# Patient Record
Sex: FEMALE | Race: WHITE | NOT HISPANIC OR LATINO | Employment: UNEMPLOYED | URBAN - METROPOLITAN AREA
[De-identification: names, ages, dates, MRNs, and addresses within clinical notes are randomized per-mention and may not be internally consistent; named-entity substitution may affect disease eponyms.]

---

## 2022-09-23 ENCOUNTER — OFFICE VISIT (OUTPATIENT)
Dept: INTERNAL MEDICINE CLINIC | Facility: CLINIC | Age: 37
End: 2022-09-23

## 2022-09-23 VITALS
HEIGHT: 63 IN | HEART RATE: 91 BPM | DIASTOLIC BLOOD PRESSURE: 75 MMHG | TEMPERATURE: 98.1 F | BODY MASS INDEX: 25.87 KG/M2 | SYSTOLIC BLOOD PRESSURE: 107 MMHG | WEIGHT: 146 LBS

## 2022-09-23 DIAGNOSIS — F32.A ANXIETY AND DEPRESSION: ICD-10-CM

## 2022-09-23 DIAGNOSIS — Z12.4 SCREENING FOR CERVICAL CANCER: ICD-10-CM

## 2022-09-23 DIAGNOSIS — R59.9 LYMPH NODE ENLARGEMENT: ICD-10-CM

## 2022-09-23 DIAGNOSIS — Z00.00 ANNUAL PHYSICAL EXAM: Primary | ICD-10-CM

## 2022-09-23 DIAGNOSIS — F41.9 ANXIETY AND DEPRESSION: ICD-10-CM

## 2022-09-23 PROCEDURE — 99385 PREV VISIT NEW AGE 18-39: CPT | Performed by: INTERNAL MEDICINE

## 2022-09-23 RX ORDER — MIRTAZAPINE 7.5 MG/1
7.5 TABLET, FILM COATED ORAL
Qty: 90 TABLET | Refills: 3 | Status: SHIPPED | OUTPATIENT
Start: 2022-09-23 | End: 2022-09-27 | Stop reason: SINTOL

## 2022-09-23 NOTE — PROGRESS NOTES
[address] [facility]    NAME: Jeffy Mercado  AGE: 44 y o  SEX: female  : 1985     DATE: 2022     Assessment and Plan:     Problem List Items Addressed This Visit    None     Visit Diagnoses     Annual physical exam    -  Primary    Relevant Orders    CBC and differential    Comprehensive metabolic panel    TSH, 3rd generation with Free T4 reflex    Lipid Panel with Direct LDL reflex    Screening for cervical cancer        Relevant Orders    Ambulatory Referral to Gynecology    Lymph node enlargement        Relevant Orders    US head neck soft tissue    Anxiety and depression        Relevant Medications    mirtazapine (REMERON) 7 5 MG tablet        1  Anxiety/Depression  - Mirtazapine prescribed to help with sleep and depression  2  R anterior cervical lymph node enlargement with FH lymphoma  -Ultrasound for abnormal features  -TSH ordered  3  Health Maintenance  Report Hep C and HIV screen in past negative, reports Tdap in last 10 years  -Referral for gyn  -labwork    Immunizations and preventive care screenings were discussed with patient today  Appropriate education was printed on patient's after visit summary  Counseling:  Alcohol/drug use: discussed moderation in alcohol intake, the recommendations for healthy alcohol use, and avoidance of illicit drug use  Dental Health: discussed importance of regular tooth brushing, flossing, and dental visits  · Exercise: the importance of regular exercise/physical activity was discussed  Recommend exercise 3-5 times per week for at least 30 minutes  BMI Counseling: Body mass index is 25 86 kg/m²  The BMI is above normal  Nutrition recommendations include decreasing fast food intake  Exercise recommendations include exercising 3-5 times per week  Rationale for BMI follow-up plan is due to patient being overweight or obese       Depression Screening and Follow-up Plan: Patient's depression screening was positive with a PHQ-2 score of 5  Their PHQ-9 score was 16  Return in about 3 months (around 2022) for Recheck, Next scheduled follow up  Chief Complaint:     Chief Complaint   Patient presents with    Annual Exam     Lump on right side of neck- HX of lymphoma in family      History of Present Illness:     Anthony Tucker is a 44 yr old F with PMH of psoriasis, anxiety/depression, breast reduction, lumbar discectomy  presents to establish care and concern for R neck lump  Pt reports sciatica and lower body muscle cramps at night  Denies neck pain, no fever/chills/weight loss  Lower back to R leg pain, muscle tension, no tingling  Unable to exercise due to lower back pain  Robaxin OTC at night  Denies chest pain, palpitations, SOB  Denies suicidal thought or ideations  Admits to one week of headache, did not try anything for it  FH- father heart attack in the 45s, mother had lymphoma and ovarian cancer  SH- denies smoking and drugs, occasional alcohol  Graduated with masters, unemployed  Traveled to [facility], [facility], [facility], from [facility])    Adult Annual Physical   Patient here for a comprehensive physical exam  The patient reports problems - R neck mass, anxiety/depression  Diet and Physical Activity  · Diet/Nutrition: well balanced diet and limited junk food  · Exercise: no formal exercise        Depression Screening  PHQ-2/9 Depression Screening    Little interest or pleasure in doing things: 2 - more than half the days  Feeling down, depressed, or hopeless: 3 - nearly every day  Trouble falling or staying asleep, or sleeping too much: 2 - more than half the days  Feeling tired or having little energy: 3 - nearly every day  Poor appetite or overeatin - several days  Feeling bad about yourself - or that you are a failure or have let yourself or your family down: 3 - nearly every day  Trouble concentrating on things, such as reading the newspaper or watching television: 2 - more than half the days  Moving or speaking so slowly that other people could have noticed  Or the opposite - being so fidgety or restless that you have been moving around a lot more than usual: 0 - not at all  Thoughts that you would be better off dead, or of hurting yourself in some way: 0 - not at all  PHQ-2 Score: 5  PHQ-2 Interpretation: POSITIVE depression screen  PHQ-9 Score: 16   PHQ-9 Interpretation: Moderately severe depression        General Health  · Sleep: poor sleep without partner home, sleeps "like a rock" when with partner  · Hearing: normal - bilateral   · Vision: no vision problems, goes for regular eye exams and wears glasses  · Dental: no dental visits for >1 year and brushes teeth twice daily  /GYN Health  · Last menstrual period: 9/23/22  · Contraceptive method: none  · History of STDs?: yes  Review of Systems:     Review of Systems   Constitutional: Positive for activity change and fatigue  Negative for chills, fever and unexpected weight change  HENT: Negative for congestion and sore throat  Respiratory: Negative for cough, chest tightness and shortness of breath  Cardiovascular: Negative for chest pain, palpitations and leg swelling  Gastrointestinal: Negative for abdominal distention, constipation, diarrhea and vomiting  Genitourinary: Negative for dysuria and hematuria  Musculoskeletal: Positive for back pain and myalgias  Negative for arthralgias, neck pain and neck stiffness  Neurological: Positive for headaches  Negative for dizziness and weakness  Psychiatric/Behavioral: Negative for confusion and suicidal ideas        Past Medical History:     Past Medical History:   Diagnosis Date    Anxiety     Cyst of left ovary     Depression     Psoriasis       Past Surgical History:     Past Surgical History:   Procedure Laterality Date    BACK SURGERY      REDUCTION MAMMAPLASTY Bilateral       Social History: Social History     Socioeconomic History    Marital status: Single     Spouse name: None    Number of children: None    Years of education: None    Highest education level: None   Occupational History    None   Tobacco Use    Smoking status: Former Smoker    Smokeless tobacco: Former User   Vaping Use    Vaping Use: Never used   Substance and Sexual Activity    Alcohol use: Yes    Drug use: Never    Sexual activity: None   Other Topics Concern    None   Social History Narrative    None     Social Determinants of Health     Financial Resource Strain: High Risk    Difficulty of Paying Living Expenses: Very hard   Food Insecurity: No Food Insecurity    Worried About Running Out of Food in the Last Year: Never true    Bela of Food in the Last Year: Never true   Transportation Needs: No Transportation Needs    Lack of Transportation (Medical): No    Lack of Transportation (Non-Medical): No   Physical Activity: Not on file   Stress: Not on file   Social Connections: Not on file   Intimate Partner Violence: Not on file   Housing Stability: Not on file      Family History:     Family History   Problem Relation Age of Onset    Ovarian cancer Mother     Lymphoma Mother     Heart disease Father     Anxiety disorder Father     Depression Father     Anxiety disorder Sister    Cami Leisure OCD Sister     Cancer Maternal Grandmother     Lymphoma Maternal Grandfather       Current Medications:     No current outpatient medications on file  No current facility-administered medications for this visit  Allergies:     No Known Allergies   Physical Exam:     /75 (BP Location: Right arm, Patient Position: Sitting, Cuff Size: Large)   Pulse 91   Temp 98 1 °F (36 7 °C) (Temporal)   Ht 5' 3" (1 6 m)   Wt 66 2 kg (146 lb)   BMI 25 86 kg/m²     Physical Exam  Constitutional:       General: She is not in acute distress  Appearance: Normal appearance  HENT:      Head: Normocephalic and atraumatic  Right Ear: Ear canal normal       Left Ear: Ear canal normal       Nose: No congestion  Mouth/Throat:      Pharynx: Oropharynx is clear  Eyes:      Extraocular Movements: Extraocular movements intact  Conjunctiva/sclera: Conjunctivae normal       Pupils: Pupils are equal, round, and reactive to light  Neck:      Thyroid: No thyromegaly or thyroid tenderness  Trachea: Trachea normal    Cardiovascular:      Rate and Rhythm: Normal rate and regular rhythm  Pulses: Normal pulses  Heart sounds: Normal heart sounds  Pulmonary:      Effort: Pulmonary effort is normal       Breath sounds: Normal breath sounds  Abdominal:      General: Bowel sounds are normal  There is no distension  Palpations: Abdomen is soft  Tenderness: There is no abdominal tenderness  Musculoskeletal:         General: No tenderness  Normal range of motion  Cervical back: Normal range of motion and neck supple  No rigidity or tenderness  No muscular tenderness  Comments: Lumbar scar present  No spinal or paraspinal tenderness to palpation   Lymphadenopathy:      Cervical: Cervical adenopathy (R anterior along SCM, small mobile) present  Skin:     General: Skin is warm  Neurological:      General: No focal deficit present  Mental Status: She is alert and oriented to person, place, and time  Cranial Nerves: Cranial nerves are intact  Sensory: Sensation is intact  Motor: Motor function is intact            Jono Oneal MD   [address]

## 2022-09-23 NOTE — PATIENT INSTRUCTIONS

## 2022-09-27 ENCOUNTER — TELEPHONE (OUTPATIENT)
Dept: INTERNAL MEDICINE CLINIC | Facility: CLINIC | Age: 37
End: 2022-09-27

## 2022-09-27 DIAGNOSIS — F32.A DEPRESSION, UNSPECIFIED DEPRESSION TYPE: Primary | ICD-10-CM

## 2022-09-27 RX ORDER — FLUOXETINE 10 MG/1
10 CAPSULE ORAL DAILY
Qty: 30 CAPSULE | Refills: 1 | Status: SHIPPED | OUTPATIENT
Start: 2022-09-27 | End: 2023-09-22 | Stop reason: SINTOL

## 2022-09-27 NOTE — TELEPHONE ENCOUNTER
Patient states she has not purchased mirtazapine (REMERON) 7 5 MG tablet as she thinks this will make her very drowsy    She states she gets sleepy very quickly    She states she needs an anti depressant that does not focus on sleeping

## 2022-09-27 NOTE — TELEPHONE ENCOUNTER
Called and spoke to patient  Patient made aware as per note  Patient stated understanding and no questions at this time

## 2022-10-11 ENCOUNTER — HOSPITAL ENCOUNTER (OUTPATIENT)
Dept: ULTRASOUND IMAGING | Facility: HOSPITAL | Age: 37
Discharge: HOME | End: 2022-10-11

## 2022-10-11 DIAGNOSIS — R59.9 LYMPH NODE ENLARGEMENT: ICD-10-CM

## 2022-10-11 PROCEDURE — 76536 US EXAM OF HEAD AND NECK: CPT

## 2022-10-12 ENCOUNTER — APPOINTMENT (OUTPATIENT)
Dept: LAB | Facility: CLINIC | Age: 37
End: 2022-10-12

## 2022-10-12 DIAGNOSIS — Z00.00 ANNUAL PHYSICAL EXAM: ICD-10-CM

## 2022-10-12 LAB
ALBUMIN SERPL BCP-MCNC: 4.1 G/DL (ref 3.5–5)
ALP SERPL-CCNC: 45 U/L (ref 46–116)
ALT SERPL W P-5'-P-CCNC: 27 U/L (ref 12–78)
ANION GAP SERPL CALCULATED.3IONS-SCNC: 4 MMOL/L (ref 4–13)
AST SERPL W P-5'-P-CCNC: 16 U/L (ref 5–45)
BASOPHILS # BLD AUTO: 0.09 THOUSANDS/ΜL (ref 0–0.1)
BASOPHILS NFR BLD AUTO: 1 % (ref 0–1)
BILIRUB SERPL-MCNC: 0.52 MG/DL (ref 0.2–1)
BUN SERPL-MCNC: 14 MG/DL (ref 5–25)
CALCIUM SERPL-MCNC: 9.2 MG/DL (ref 8.3–10.1)
CHLORIDE SERPL-SCNC: 107 MMOL/L (ref 96–108)
CHOLEST SERPL-MCNC: 207 MG/DL
CO2 SERPL-SCNC: 27 MMOL/L (ref 21–32)
CREAT SERPL-MCNC: 0.85 MG/DL (ref 0.6–1.3)
EOSINOPHIL # BLD AUTO: 0.37 THOUSAND/ΜL (ref 0–0.61)
EOSINOPHIL NFR BLD AUTO: 6 % (ref 0–6)
ERYTHROCYTE [DISTWIDTH] IN BLOOD BY AUTOMATED COUNT: 13.2 % (ref 11.6–15.1)
GFR SERPL CREATININE-BSD FRML MDRD: 87 ML/MIN/1.73SQ M
GLUCOSE P FAST SERPL-MCNC: 97 MG/DL (ref 65–99)
HCT VFR BLD AUTO: 41.8 % (ref 34.8–46.1)
HDLC SERPL-MCNC: 65 MG/DL
HGB BLD-MCNC: 13.5 G/DL (ref 11.5–15.4)
IMM GRANULOCYTES # BLD AUTO: 0.01 THOUSAND/UL (ref 0–0.2)
IMM GRANULOCYTES NFR BLD AUTO: 0 % (ref 0–2)
LDLC SERPL CALC-MCNC: 125 MG/DL (ref 0–100)
LYMPHOCYTES # BLD AUTO: 2.19 THOUSANDS/ΜL (ref 0.6–4.47)
LYMPHOCYTES NFR BLD AUTO: 33 % (ref 14–44)
MCH RBC QN AUTO: 28.8 PG (ref 26.8–34.3)
MCHC RBC AUTO-ENTMCNC: 32.3 G/DL (ref 31.4–37.4)
MCV RBC AUTO: 89 FL (ref 82–98)
MONOCYTES # BLD AUTO: 0.55 THOUSAND/ΜL (ref 0.17–1.22)
MONOCYTES NFR BLD AUTO: 8 % (ref 4–12)
NEUTROPHILS # BLD AUTO: 3.45 THOUSANDS/ΜL (ref 1.85–7.62)
NEUTS SEG NFR BLD AUTO: 52 % (ref 43–75)
NRBC BLD AUTO-RTO: 0 /100 WBCS
PLATELET # BLD AUTO: 212 THOUSANDS/UL (ref 149–390)
PMV BLD AUTO: 11.5 FL (ref 8.9–12.7)
POTASSIUM SERPL-SCNC: 4.8 MMOL/L (ref 3.5–5.3)
PROT SERPL-MCNC: 7.5 G/DL (ref 6.4–8.4)
RBC # BLD AUTO: 4.68 MILLION/UL (ref 3.81–5.12)
SODIUM SERPL-SCNC: 138 MMOL/L (ref 135–147)
TRIGL SERPL-MCNC: 87 MG/DL
TSH SERPL DL<=0.05 MIU/L-ACNC: 2.3 UIU/ML (ref 0.45–4.5)
WBC # BLD AUTO: 6.66 THOUSAND/UL (ref 4.31–10.16)

## 2022-10-12 PROCEDURE — 84443 ASSAY THYROID STIM HORMONE: CPT

## 2022-10-12 PROCEDURE — 36415 COLL VENOUS BLD VENIPUNCTURE: CPT

## 2022-10-12 PROCEDURE — 80061 LIPID PANEL: CPT

## 2022-10-12 PROCEDURE — 85025 COMPLETE CBC W/AUTO DIFF WBC: CPT

## 2022-10-12 PROCEDURE — 80053 COMPREHEN METABOLIC PANEL: CPT

## 2022-12-02 ENCOUNTER — OFFICE VISIT (OUTPATIENT)
Dept: OBGYN CLINIC | Facility: CLINIC | Age: 37
End: 2022-12-02

## 2022-12-02 VITALS
HEIGHT: 62 IN | SYSTOLIC BLOOD PRESSURE: 102 MMHG | DIASTOLIC BLOOD PRESSURE: 72 MMHG | WEIGHT: 136.69 LBS | BODY MASS INDEX: 25.15 KG/M2

## 2022-12-02 DIAGNOSIS — R10.2 PELVIC PAIN: ICD-10-CM

## 2022-12-02 DIAGNOSIS — Z80.41 FAMILY HISTORY OF OVARIAN CANCER: ICD-10-CM

## 2022-12-02 DIAGNOSIS — Z12.4 SCREENING FOR CERVICAL CANCER: ICD-10-CM

## 2022-12-02 DIAGNOSIS — Z01.419 WELL WOMAN EXAM WITH ROUTINE GYNECOLOGICAL EXAM: Primary | ICD-10-CM
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
## 2022-12-02 NOTE — PROGRESS NOTES
Patient presents for a routine annual visit  Menarche- 14 Y/O  Last Pap Smear- Not on file  LMP- 11/16/22  Birth control- none  G0  Non smoker  Social drinker  Currently sexually active  Mom has ovarian cancer  Pt having pain with intercourse and cramping

## 2022-12-05 LAB
HPV HR 12 DNA CVX QL NAA+PROBE: NEGATIVE
HPV16 DNA CVX QL NAA+PROBE: NEGATIVE
HPV18 DNA CVX QL NAA+PROBE: NEGATIVE

## 2022-12-06 LAB
C TRACH DNA SPEC QL NAA+PROBE: NEGATIVE
N GONORRHOEA DNA SPEC QL NAA+PROBE: NEGATIVE

## 2022-12-08 ENCOUNTER — TELEPHONE (OUTPATIENT)
Dept: GENETICS | Facility: CLINIC | Age: 37
End: 2022-12-08

## 2022-12-08 NOTE — TELEPHONE ENCOUNTER
I called Carlota Olivier to schedule a new patient appointment with the [facility]       Outcome:   I left a voice message encouraging the patient to call the genetics team at [phone] to schedule this appointment  Follow-up:   At this time the referral will be closed and we will wait to hear back from the patient regarding scheduling this appointment

## 2022-12-12 LAB
LAB AP GYN PRIMARY INTERPRETATION: NORMAL
Lab: NORMAL

## 2022-12-13 LAB — T VAGINALIS RRNA SPEC QL NAA+PROBE: NEGATIVE

## 2022-12-14 ENCOUNTER — HOSPITAL ENCOUNTER (OUTPATIENT)
Dept: RADIOLOGY | Age: 37
Discharge: HOME | End: 2022-12-14

## 2022-12-14 DIAGNOSIS — R10.2 PELVIC PAIN: ICD-10-CM

## 2023-09-22 ENCOUNTER — TELEPHONE (OUTPATIENT)
Dept: LAB | Facility: HOSPITAL | Age: 38
End: 2023-09-22

## 2023-09-22 ENCOUNTER — OFFICE VISIT (OUTPATIENT)
Dept: FAMILY MEDICINE CLINIC | Facility: CLINIC | Age: 38
End: 2023-09-22
Payer: COMMERCIAL

## 2023-09-22 VITALS
SYSTOLIC BLOOD PRESSURE: 120 MMHG | TEMPERATURE: 96.4 F | HEART RATE: 74 BPM | OXYGEN SATURATION: 99 % | WEIGHT: 138.2 LBS | HEIGHT: 63 IN | DIASTOLIC BLOOD PRESSURE: 70 MMHG | BODY MASS INDEX: 24.49 KG/M2

## 2023-09-22 DIAGNOSIS — Z28.21 IMMUNIZATION REFUSED: ICD-10-CM

## 2023-09-22 DIAGNOSIS — N80.9 ENDOMETRIOSIS: ICD-10-CM

## 2023-09-22 DIAGNOSIS — F43.21 SITUATIONAL DEPRESSION: ICD-10-CM

## 2023-09-22 DIAGNOSIS — Z82.49 FAMILY HISTORY OF HEART DISEASE: ICD-10-CM

## 2023-09-22 DIAGNOSIS — Z80.41 FAMILY HISTORY OF OVARIAN CANCER: ICD-10-CM

## 2023-09-22 DIAGNOSIS — E78.2 MIXED HYPERLIPIDEMIA: ICD-10-CM

## 2023-09-22 DIAGNOSIS — R11.0 NAUSEA: ICD-10-CM

## 2023-09-22 DIAGNOSIS — R53.82 CHRONIC FATIGUE: ICD-10-CM

## 2023-09-22 DIAGNOSIS — R10.821 RIGHT UPPER QUADRANT ABDOMINAL TENDERNESS WITH REBOUND TENDERNESS: ICD-10-CM

## 2023-09-22 DIAGNOSIS — R14.0 BLOATING: Primary | ICD-10-CM

## 2023-09-22 PROBLEM — G89.29 CHRONIC HEADACHE: Status: ACTIVE | Noted: 2023-09-22

## 2023-09-22 PROBLEM — F90.9 ADHD: Status: ACTIVE | Noted: 2023-09-22

## 2023-09-22 PROBLEM — R51.9 CHRONIC HEADACHE: Status: ACTIVE | Noted: 2023-09-22

## 2023-09-22 PROBLEM — R45.4 EXCESSIVE ANGER: Status: ACTIVE | Noted: 2023-09-22

## 2023-09-22 PROBLEM — L65.9 HAIR LOSS: Status: ACTIVE | Noted: 2023-09-22

## 2023-09-22 PROBLEM — G89.29 CHRONIC PAIN: Status: ACTIVE | Noted: 2023-09-22

## 2023-09-22 PROCEDURE — 99205 OFFICE O/P NEW HI 60 MIN: CPT | Performed by: FAMILY MEDICINE

## 2023-09-22 RX ORDER — CHLORAL HYDRATE 500 MG
1000 CAPSULE ORAL DAILY
COMMUNITY

## 2023-09-22 RX ORDER — BACILLUS COAGULANS/INULIN 1B-250 MG
CAPSULE ORAL
COMMUNITY

## 2023-09-22 RX ORDER — MAGNESIUM GLYCINATE 100 MG
CAPSULE ORAL
COMMUNITY

## 2023-09-22 NOTE — ASSESSMENT & PLAN NOTE
Finding on the blood work October 2022  Diagnosis is discussed with the patient she is not candidate for statin and low-fat diet reviewed with the patient and recommended Mediterranean diet

## 2023-09-22 NOTE — ASSESSMENT & PLAN NOTE
symptomatic has been going on for patient has a history of travel left epididymis area where common to have H. pylori infection discussed with the patient recommend to do H. pylori antigen test in the stool we will follow-up accordingly

## 2023-09-22 NOTE — ASSESSMENT & PLAN NOTE
Symptomatic  plan to do blood work to rule out thyroid problem rule out anemia rule out vitamin D deficiency and and hematology disease chronic discussed sleeping hygiene and encouraged to continue well hydration we will follow-up accordingly discussed with patient also chronic fatigue and secondary to her depression she is aware

## 2023-09-22 NOTE — PROGRESS NOTES
Name: Kirsty Allen      : 1985      MRN: [id number]  Encounter Provider: Marni Miranda MD  Encounter Date: 2023   Encounter department: 1 [facility] 270     1. Bloating  Assessment & Plan:  symptomatic has been going on for patient has a history of travel left epididymis area where common to have H. pylori infection discussed with the patient recommend to do H. pylori antigen test in the stool we will follow-up accordingly    Orders:  -     CBC and differential; Future  -     Comprehensive metabolic panel; Future  -     Lipid panel; Future  -     TSH, 3rd generation with Free T4 reflex; Future  -     Vitamin B12; Future  -     ELIZABETH Screen w/ Reflex to Titer/Pattern; Future  -     Lyme Disease Serology w/Reflex; Future  -     RF Screen w/ Reflex to Titer; Future  -     Vitamin D 25 hydroxy; Future  -     Helicobacter pylori, IgA; Future    2. Chronic fatigue  Assessment & Plan:  Symptomatic  plan to do blood work to rule out thyroid problem rule out anemia rule out vitamin D deficiency and and hematology disease chronic discussed sleeping hygiene and encouraged to continue well hydration we will follow-up accordingly discussed with patient also chronic fatigue and secondary to her depression she is aware    Orders:  -     CBC and differential; Future  -     Comprehensive metabolic panel; Future  -     Lipid panel; Future  -     TSH, 3rd generation with Free T4 reflex; Future  -     Vitamin B12; Future  -     ELIZABETH Screen w/ Reflex to Titer/Pattern; Future  -     Lyme Disease Serology w/Reflex; Future  -     RF Screen w/ Reflex to Titer; Future  -     Vitamin D 25 hydroxy; Future    3. Situational depression  Assessment & Plan:  New diagnosis patient tested positive PHQ-9 reported 16 patient follow-up with a therapist she has intolerance to medication      4.  Endometriosis  Assessment & Plan:  Refer patient to see GYN    Orders:  -     Ambulatory Referral to Obstetrics / Gynecology; Future    5. Right upper quadrant abdominal tenderness with rebound tenderness  Assessment & Plan:  Finding on physical exam positive tenderness in the right upper quadrant in Patient with history of bloating and nausea recommend to do ultrasound to rule out gallbladder disease    Orders:  -     US right upper quadrant; Future; Expected date: 09/22/2023    6. Mixed hyperlipidemia  Assessment & Plan:  Finding on the blood work October 2022  Diagnosis is discussed with the patient she is not candidate for statin and low-fat diet reviewed with the patient and recommended Mediterranean diet    Orders:  -     CBC and differential; Future  -     Comprehensive metabolic panel; Future  -     Lipid panel; Future  -     TSH, 3rd generation with Free T4 reflex; Future  -     Vitamin B12; Future  -     ELIZABETH Screen w/ Reflex to Titer/Pattern; Future  -     Lyme Disease Serology w/Reflex; Future  -     RF Screen w/ Reflex to Titer; Future  -     Vitamin D 25 hydroxy; Future    7. Nausea  -     CBC and differential; Future  -     Comprehensive metabolic panel; Future  -     Lipid panel; Future  -     TSH, 3rd generation with Free T4 reflex; Future  -     Vitamin B12; Future  -     ELIZABETH Screen w/ Reflex to Titer/Pattern; Future  -     Lyme Disease Serology w/Reflex; Future  -     RF Screen w/ Reflex to Titer; Future  -     Vitamin D 25 hydroxy; Future  -     Helicobacter pylori, IgA; Future    8. Immunization refused  Assessment & Plan:  Patient declined flu shot for today      9. Family history of heart disease  Assessment & Plan:  Patient father has coronary artery disease patient has history of hyperlipidemia recommend to recheck lipid panel      10.  Family history of ovarian cancer           Subjective      New in my office he did start his care and the patient had multiple concern  1-she is concerned about bloating nausea and has been going on for many months she feels does not matter what she eats she gets bloated and feels shortness well up no vomiting no constipation no diarrhea no blood in the stool no change in her diet patient uses to travel a lot and lives in [place] area no fever  2-also patient concerned about extreme fatigue she feels washed out and this is also has been going on for a while  patient admits she drinking enough water she is sleeping well no rash no muscle pain or atrophy  no blurred vision she had a chronic headache patient has history of chronic pain and she had history of chronic low back pain for which she had back surgery  3-patient has a history of endometriosis she did not follow-up with a specialist in [facility] and she still symptomatic  4-depression screen tested +16 PHQ-9 patient and feel it is situational since that she moved to live with  States secondary to her  he is not from here and she is feeling depressed   Past medical surgical family and social history reviewed with the patient also blood work from October 2022 reviewed with the patient she has hyperlipidemia    Review of Systems   Constitutional: Positive for fatigue. Negative for chills and fever. HENT: Negative for congestion, ear pain and sore throat. Eyes: Negative for pain and visual disturbance. Respiratory: Negative for cough and shortness of breath. Cardiovascular: Negative for chest pain and palpitations. Gastrointestinal: Positive for nausea. Negative for abdominal pain, constipation, diarrhea and vomiting. Bloating   Genitourinary: Negative for dysuria, hematuria and vaginal discharge. Skin: Negative for color change and rash. Neurological: Negative for seizures and syncope. All other systems reviewed and are negative.       Current Outpatient Medications on File Prior to Visit   Medication Sig   • Bacillus Coagulans-Inulin (Probiotic) 1-250 BILLION-MG CAPS Take by mouth   • Cholecalciferol (Vitamin D-3) 125 MCG (5000 UT) TABS Take by mouth   • Magnesium Glycinate 100 MG CAPS Take by mouth   • Omega-3 Fatty Acids (fish oil) 1,000 mg Take 1,000 mg by mouth daily   • [DISCONTINUED] FLUoxetine (PROzac) 10 mg capsule Take 1 capsule (10 mg total) by mouth daily       Objective     /70 (BP Location: Left arm, Patient Position: Sitting, Cuff Size: Standard)   Pulse 74   Temp (!) 96.4 °F (35.8 °C) (Tympanic)   Ht 5' 2.6" (1.59 m)   Wt 62.7 kg (138 lb 3.2 oz)   SpO2 99%   BMI 24.80 kg/m²     Physical Exam  Constitutional:       General: She is not in acute distress. Appearance: She is well-developed. She is not diaphoretic. HENT:      Head: Normocephalic. Right Ear: External ear normal.      Left Ear: External ear normal.      Nose: No congestion or rhinorrhea. Mouth/Throat:      Pharynx: No oropharyngeal exudate or posterior oropharyngeal erythema. Eyes:      General:         Right eye: No discharge. Left eye: No discharge. Conjunctiva/sclera: Conjunctivae normal.   Neck:      Vascular: No JVD. Cardiovascular:      Rate and Rhythm: Normal rate and regular rhythm. Heart sounds: Normal heart sounds. No murmur heard. No gallop. Pulmonary:      Effort: Pulmonary effort is normal. No respiratory distress. Breath sounds: Normal breath sounds. No stridor. No wheezing or rales. Chest:      Chest wall: No tenderness. Abdominal:      General: There is no distension. Palpations: Abdomen is soft. There is no mass. Tenderness: There is abdominal tenderness in the right upper quadrant. There is no guarding or rebound. Musculoskeletal:         General: No tenderness. Cervical back: Normal range of motion and neck supple. Lymphadenopathy:      Cervical: No cervical adenopathy. Skin:     General: Skin is warm. Findings: No erythema or rash. Neurological:      Mental Status: She is alert and oriented to person, place, and time.        Mal Ballard MD

## 2023-09-22 NOTE — ASSESSMENT & PLAN NOTE
Patient father has coronary artery disease patient has history of hyperlipidemia recommend to recheck lipid panel

## 2023-09-22 NOTE — TELEPHONE ENCOUNTER
Pt wanted to make an appt for the lab at [facility] - does not need mobile lab services

## 2023-09-22 NOTE — ASSESSMENT & PLAN NOTE
Finding on physical exam positive tenderness in the right upper quadrant in Patient with history of bloating and nausea recommend to do ultrasound to rule out gallbladder disease

## 2023-09-22 NOTE — ASSESSMENT & PLAN NOTE
New diagnosis patient tested positive PHQ-9 reported 16 patient follow-up with a therapist she has intolerance to medication

## 2023-09-23 ENCOUNTER — HOSPITAL ENCOUNTER (OUTPATIENT)
Dept: RADIOLOGY | Facility: HOSPITAL | Age: 38
Discharge: HOME | End: 2023-09-23
Payer: COMMERCIAL

## 2023-09-23 ENCOUNTER — LAB (OUTPATIENT)
Dept: LAB | Facility: CLINIC | Age: 38
End: 2023-09-23
Payer: COMMERCIAL

## 2023-09-23 DIAGNOSIS — E78.2 MIXED HYPERLIPIDEMIA: ICD-10-CM

## 2023-09-23 DIAGNOSIS — R11.0 NAUSEOUS: ICD-10-CM

## 2023-09-23 DIAGNOSIS — R14.0 BLOATING: ICD-10-CM

## 2023-09-23 DIAGNOSIS — R53.82 CHRONIC FATIGUE: ICD-10-CM

## 2023-09-23 DIAGNOSIS — R11.0 NAUSEA: ICD-10-CM

## 2023-09-23 DIAGNOSIS — R10.821 RIGHT UPPER QUADRANT ABDOMINAL TENDERNESS WITH REBOUND TENDERNESS: ICD-10-CM

## 2023-09-23 LAB
25(OH)D3 SERPL-MCNC: 36.7 NG/ML (ref 30–100)
ALBUMIN SERPL BCP-MCNC: 4.6 G/DL (ref 3.5–5)
ALP SERPL-CCNC: 41 U/L (ref 34–104)
ALT SERPL W P-5'-P-CCNC: 22 U/L (ref 7–52)
ANA SER QL IA: NEGATIVE
ANION GAP SERPL CALCULATED.3IONS-SCNC: 5 MMOL/L
AST SERPL W P-5'-P-CCNC: 18 U/L (ref 13–39)
BASOPHILS # BLD AUTO: 0.09 THOUSANDS/ÂΜL (ref 0–0.1)
BASOPHILS NFR BLD AUTO: 2 % (ref 0–1)
BILIRUB SERPL-MCNC: 0.45 MG/DL (ref 0.2–1)
BUN SERPL-MCNC: 11 MG/DL (ref 5–25)
CALCIUM SERPL-MCNC: 9.6 MG/DL (ref 8.4–10.2)
CHLORIDE SERPL-SCNC: 107 MMOL/L (ref 96–108)
CHOLEST SERPL-MCNC: 212 MG/DL
CO2 SERPL-SCNC: 28 MMOL/L (ref 21–32)
CREAT SERPL-MCNC: 0.76 MG/DL (ref 0.6–1.3)
EOSINOPHIL # BLD AUTO: 0.3 THOUSAND/ÂΜL (ref 0–0.61)
EOSINOPHIL NFR BLD AUTO: 5 % (ref 0–6)
ERYTHROCYTE [DISTWIDTH] IN BLOOD BY AUTOMATED COUNT: 12.8 % (ref 11.6–15.1)
GFR SERPL CREATININE-BSD FRML MDRD: 99 ML/MIN/1.73SQ M
GLUCOSE P FAST SERPL-MCNC: 91 MG/DL (ref 65–99)
HCT VFR BLD AUTO: 41.8 % (ref 34.8–46.1)
HDLC SERPL-MCNC: 65 MG/DL
HGB BLD-MCNC: 13.6 G/DL (ref 11.5–15.4)
IMM GRANULOCYTES # BLD AUTO: 0.01 THOUSAND/UL (ref 0–0.2)
IMM GRANULOCYTES NFR BLD AUTO: 0 % (ref 0–2)
LDLC SERPL CALC-MCNC: 137 MG/DL (ref 0–100)
LYMPHOCYTES # BLD AUTO: 2.13 THOUSANDS/ÂΜL (ref 0.6–4.47)
LYMPHOCYTES NFR BLD AUTO: 38 % (ref 14–44)
MCH RBC QN AUTO: 29.6 PG (ref 26.8–34.3)
MCHC RBC AUTO-ENTMCNC: 32.5 G/DL (ref 31.4–37.4)
MCV RBC AUTO: 91 FL (ref 82–98)
MONOCYTES # BLD AUTO: 0.35 THOUSAND/ÂΜL (ref 0.17–1.22)
MONOCYTES NFR BLD AUTO: 6 % (ref 4–12)
NEUTROPHILS # BLD AUTO: 2.74 THOUSANDS/ÂΜL (ref 1.85–7.62)
NEUTS SEG NFR BLD AUTO: 49 % (ref 43–75)
NONHDLC SERPL-MCNC: 147 MG/DL
NRBC BLD AUTO-RTO: 0 /100 WBCS
PLATELET # BLD AUTO: 195 THOUSANDS/UL (ref 149–390)
PMV BLD AUTO: 11.8 FL (ref 8.9–12.7)
POTASSIUM SERPL-SCNC: 4.4 MMOL/L (ref 3.5–5.3)
PROT SERPL-MCNC: 7.2 G/DL (ref 6.4–8.4)
RBC # BLD AUTO: 4.59 MILLION/UL (ref 3.81–5.12)
SODIUM SERPL-SCNC: 140 MMOL/L (ref 135–147)
TRIGL SERPL-MCNC: 48 MG/DL
TSH SERPL DL<=0.05 MIU/L-ACNC: 2.73 UIU/ML (ref 0.45–4.5)
VIT B12 SERPL-MCNC: 1436 PG/ML (ref 180–914)
WBC # BLD AUTO: 5.62 THOUSAND/UL (ref 4.31–10.16)

## 2023-09-23 PROCEDURE — 84443 ASSAY THYROID STIM HORMONE: CPT

## 2023-09-23 PROCEDURE — 80053 COMPREHEN METABOLIC PANEL: CPT

## 2023-09-23 PROCEDURE — 85025 COMPLETE CBC W/AUTO DIFF WBC: CPT

## 2023-09-23 PROCEDURE — 76705 ECHO EXAM OF ABDOMEN: CPT

## 2023-09-23 PROCEDURE — 82306 VITAMIN D 25 HYDROXY: CPT

## 2023-09-23 PROCEDURE — 36415 COLL VENOUS BLD VENIPUNCTURE: CPT

## 2023-09-23 PROCEDURE — 82607 VITAMIN B-12: CPT

## 2023-09-23 PROCEDURE — 80061 LIPID PANEL: CPT

## 2023-09-23 PROCEDURE — 86430 RHEUMATOID FACTOR TEST QUAL: CPT

## 2023-09-23 PROCEDURE — 86618 LYME DISEASE ANTIBODY: CPT

## 2023-09-23 PROCEDURE — 86038 ANTINUCLEAR ANTIBODIES: CPT

## 2023-09-23 PROCEDURE — 86677 HELICOBACTER PYLORI ANTIBODY: CPT

## 2023-09-24 LAB
B BURGDOR IGG+IGM SER QL IA: NEGATIVE
RHEUMATOID FACT SER QL LA: NEGATIVE

## 2023-09-25 ENCOUNTER — APPOINTMENT (OUTPATIENT)
Dept: LAB | Facility: HOSPITAL | Age: 38
End: 2023-09-25
Payer: COMMERCIAL

## 2023-09-25 ENCOUNTER — TELEPHONE (OUTPATIENT)
Dept: FAMILY MEDICINE CLINIC | Facility: CLINIC | Age: 38
End: 2023-09-25

## 2023-09-25 DIAGNOSIS — R14.0 BLOATING: Primary | ICD-10-CM

## 2023-09-25 DIAGNOSIS — R14.0 BLOATING: ICD-10-CM

## 2023-09-25 DIAGNOSIS — R53.82 CHRONIC FATIGUE: Primary | ICD-10-CM

## 2023-09-25 PROCEDURE — 87338 HPYLORI STOOL AG IA: CPT

## 2023-09-25 NOTE — TELEPHONE ENCOUNTER
Patient saw her B12 results but wanted you to know that she was taking beef liver supplements that is high in B12 which she did stop taking

## 2023-09-25 NOTE — TELEPHONE ENCOUNTER
----- Message from Nori White MD sent at 9/25/2023  3:23 PM EDT -----  Recommend to hold on any B12 supplement repeat level in 4 w

## 2023-09-26 LAB — H PYLORI AG STL QL IA: NEGATIVE

## 2023-09-29 ENCOUNTER — TELEPHONE (OUTPATIENT)
Age: 38
End: 2023-09-29

## 2023-09-29 ENCOUNTER — TELEPHONE (OUTPATIENT)
Dept: FAMILY MEDICINE CLINIC | Facility: CLINIC | Age: 38
End: 2023-09-29

## 2023-09-29 DIAGNOSIS — R93.89 ABNORMAL FINDINGS ON DIAGNOSTIC IMAGING OF BODY STRUCTURES: Primary | ICD-10-CM

## 2023-09-29 NOTE — TELEPHONE ENCOUNTER
Patient notified of results referral placed for urology contact information provided to patient to schedule

## 2023-09-29 NOTE — TELEPHONE ENCOUNTER
Called Margi and schedule her with Dr Randa Barnhart at the [facility] office I October 26, 2023@ 11:00

## 2023-09-29 NOTE — TELEPHONE ENCOUNTER
New Patient    What is the reason for the patient’s appointment?:Patient called stating she was referred to see Urology . She had u/s done and it showed     Incidental 6 mm right renal echogenic nodule most likely angiomyolipoma. Renal ultrasound follow-up in 6 months is advised.     Remainder of the examination is normal.     This study demonstrates a finding requiring imaging follow-up and was documented as such in Epic.     Please review and see how soon patient is to be seen    Patient can be reached at [phone]       What office location does the patient prefer?:[place] but will go to sooner appt at [facility] or [facility]    Does patient have Imaging/Lab Results:    Have patient records been requested?:  If No, are the records showing in Epic:       INSURANCE:   Do we accept the patient's insurance or is the patient Self-Pay?:    [address] [place] [address] Type/Number:   Member ID#:       HISTORY:   Has the patient had any previous Urologist(s)?:no     Was the patient seen in the ED?:    Has the patient had any outside testing done?:    Does the patient have a personal history of cancer?:no .

## 2023-09-29 NOTE — TELEPHONE ENCOUNTER
----- Message from Cookie Burroughs MD sent at 9/29/2023 10:43 AM EDT -----  Incidental findings on the ultrasound renal nodule recommend to follow-up by urology

## 2023-10-03 ENCOUNTER — TELEPHONE (OUTPATIENT)
Dept: FAMILY MEDICINE CLINIC | Facility: CLINIC | Age: 38
End: 2023-10-03

## 2023-10-03 DIAGNOSIS — L29.0 RECTAL ITCHING: ICD-10-CM

## 2023-10-03 DIAGNOSIS — R10.84 GENERALIZED ABDOMINAL PAIN: ICD-10-CM

## 2023-10-03 DIAGNOSIS — R93.89 ABNORMAL FINDINGS ON DIAGNOSTIC IMAGING OF BODY STRUCTURES: Primary | ICD-10-CM

## 2023-10-03 LAB — MISCELLANEOUS LAB TEST RESULT: NORMAL

## 2023-10-03 NOTE — TELEPHONE ENCOUNTER
Patient called into the office to see if Dr. Rangel Ortega would also send over test for parasites along with h pylori. Patient is concerned for parasite due to symptoms and [place]. Please advise.

## 2023-10-03 NOTE — TELEPHONE ENCOUNTER
----- Message from Constantin Hayes MD sent at 10/3/2023  1:16 PM EDT -----  Recommend H pylori Antigen in stool

## 2023-10-04 NOTE — TELEPHONE ENCOUNTER
Called and spoke with patient to see if patient is having any symptoms for parasite test to be placed. Patient states that she has constant abdomen pain. Patient also states that she has been having rectal itching for the last year and a had or two years. patient states that the itching starts and lasts for a few weeks and goes away than proceeds to come back.

## 2023-10-09 ENCOUNTER — APPOINTMENT (OUTPATIENT)
Dept: LAB | Facility: CLINIC | Age: 38
End: 2023-10-09
Payer: COMMERCIAL

## 2023-10-09 ENCOUNTER — APPOINTMENT (OUTPATIENT)
Dept: LAB | Facility: CLINIC | Age: 38
End: 2023-10-09

## 2023-10-09 DIAGNOSIS — R53.82 CHRONIC FATIGUE: ICD-10-CM

## 2023-10-11 ENCOUNTER — TELEPHONE (OUTPATIENT)
Dept: FAMILY MEDICINE CLINIC | Facility: CLINIC | Age: 38
End: 2023-10-11

## 2023-10-11 DIAGNOSIS — K92.1 BLOOD IN STOOL: Primary | ICD-10-CM

## 2023-10-11 NOTE — TELEPHONE ENCOUNTER
----- Message from Presley Fishman MD sent at 10/11/2023  1:14 PM EDT -----  Positive blood in stool to see GI

## 2023-10-11 NOTE — TELEPHONE ENCOUNTER
Anesthesia Evaluation     Patient summary reviewed and Nursing notes reviewed   no history of anesthetic complications:  NPO Solid Status: > 6 hours  NPO Liquid Status: > 6 hours           Airway   Mallampati: II  TM distance: >3 FB  Neck ROM: full  no difficulty expected and No difficulty expected  Dental - normal exam   (+) edentulous    Pulmonary - normal exam    breath sounds clear to auscultation  (+) sleep apnea,   (-) rhonchi, decreased breath sounds, wheezes, rales, stridor  Cardiovascular - normal exam    NYHA Classification: I  Rhythm: regular  Rate: normal    (+) hypertension, PVD, hyperlipidemia,   (-) murmur, weak pulses, friction rub, systolic click, carotid bruits, JVD, peripheral edema      Neuro/Psych  (+) seizures, headaches, syncope, numbness,      ROS Comment: Ataxia    GI/Hepatic/Renal/Endo    (+) obesity,   diabetes mellitus type 1 using insulin,     Musculoskeletal     (+) back pain, neck pain,   Abdominal  - normal exam    Abdomen: soft.   Substance History - negative use     OB/GYN negative ob/gyn ROS         Other   arthritis,    history of cancer remission                    Anesthesia Plan    ASA 3     MAC     intravenous induction     Anesthetic plan, risks, benefits, and alternatives have been provided, discussed and informed consent has been obtained with: patient.        CODE STATUS:    Medical Intervention Limits: NO intubation (DNI)  Level Of Support Discussed With: Patient  Code Status (Patient has no pulse and is not breathing): No CPR (Do Not Attempt to Resuscitate)  Medical Interventions (Patient has pulse or is breathing): Limited Support  Release to patient: Routine Release       Lm for patient regarding blood in stool and also left a my chart message.  Referral placed for GI

## 2023-10-12 ENCOUNTER — TELEPHONE (OUTPATIENT)
Dept: FAMILY MEDICINE CLINIC | Facility: CLINIC | Age: 38
End: 2023-10-12

## 2023-10-12 NOTE — TELEPHONE ENCOUNTER
Patient left message that she was positive for hplori- was sent to gi but can't get in until November. Wants to know what to do until then.  Please advise

## 2023-10-23 ENCOUNTER — OFFICE VISIT (OUTPATIENT)
Dept: OBGYN CLINIC | Facility: CLINIC | Age: 38
End: 2023-10-23
Payer: COMMERCIAL

## 2023-10-23 VITALS — WEIGHT: 141 LBS | SYSTOLIC BLOOD PRESSURE: 100 MMHG | BODY MASS INDEX: 25.3 KG/M2 | DIASTOLIC BLOOD PRESSURE: 64 MMHG

## 2023-10-23 DIAGNOSIS — N80.9 ENDOMETRIOSIS: ICD-10-CM

## 2023-10-23 DIAGNOSIS — R10.2 PELVIC PAIN: Primary | ICD-10-CM

## 2023-10-23 PROCEDURE — 99214 OFFICE O/P EST MOD 30 MIN: CPT | Performed by: STUDENT IN AN ORGANIZED HEALTH CARE EDUCATION/TRAINING PROGRAM

## 2023-10-23 NOTE — PROGRESS NOTES
Assessment/Plan:     Problem List Items Addressed This Visit          Other    Endometriosis    Relevant Orders    US pelvis complete w transvaginal     Other Visit Diagnoses       Pelvic pain    -  Primary    Relevant Orders    US pelvis complete w transvaginal          - discussed clinical diagnosis of endometriosis. No longer required to have diagnostic laparoscopy to diagnose or treat. With cramping prior to menses, chronic pelvic pain, dyschesia, dyspareunia, likely infertility, she certainly meets criteria. HOWEVER, prior STI, potential GI confounders could also cause many of her symptoms. Reviewed the erosion of endometriosis through the bowel mucosa, for resultant hematochezia, is rare. - reviewed images of prior ultrasound in detail with patient. Discussed possible endometrioma on ovary vs. Hemorrhagic/collapsing cyst. Given her pain is mostly on this side, the two could most certainly be related. - will RTO after discussion with GI, for review of ultrasound, and for discussion regarding surgical intervention. Will likely recommend TLH, BS with resection of endometriosis for most definitive management, given lack of interest in childbearing. Will discuss BSO at that time given family history. RTO 3-4 weeks    Subjective:      Patient ID: Eitan Aranda is a 43 y.o. female. HPI  She presents today for consultation regarding endometriosis. She has never had a "formal" diagnosis, had thought/been told that she needed a diagnostic laparoscopy for same. She notes that she had been on OCPs for many years, stopped secondary to mood symptoms 10 years ago. Since then, she has struggled with dysmenorrhea, both before and during cycles. Pain has been worsening over time. In the last few years, she has noted worsening RLQ pain. In the last year, she notes constant low midline pelvic pain. In the last few months, she notes non-positional dyspareunia and postcoital bleeding.      Used condoms x10 years since d/c OCPs, without any pregnancy scares. Has been using coitus interruptus x2 years with now . She is not interested in childbearing, hopes for adoption. The following portions of the patient's history were reviewed and updated as appropriate: allergies, current medications, past family history, past medical history, past social history, past surgical history, and problem list.    Review of Systems  as above    Objective:  /64 (BP Location: Left arm, Patient Position: Sitting, Cuff Size: Standard)   Wt 64 kg (141 lb)   LMP 10/21/2023 (Exact Date)   BMI 25.30 kg/m²      Physical Exam  Vitals reviewed. Constitutional:       Appearance: She is well-developed. HENT:      Head: Normocephalic. Cardiovascular:      Rate and Rhythm: Normal rate. Pulmonary:      Effort: Pulmonary effort is normal.   Abdominal:      General: There is no distension. Palpations: Abdomen is soft. Tenderness: There is no abdominal tenderness. There is no guarding or rebound. Genitourinary:     General: Normal vulva. Exam position: Lithotomy position. Vagina: No bleeding. Cervix: No cervical motion tenderness. Comments: Vulva normal, with retraction of perineal body. Vagina normal, without visible endometrial implants  On bimanual exam, uterus does not feel mobile, retroverted and retroflexed. Unable to palpate uterine contour. Generalized pain throughout, non-focal. No masses palpated. Bladder notably tender to palpation. Musculoskeletal:         General: Normal range of motion. Cervical back: Normal range of motion. Skin:     General: Skin is warm and dry. Neurological:      Mental Status: She is alert and oriented to person, place, and time.    Psychiatric:         Behavior: Behavior normal.         Prior TVUS 12/22: uterus heterogeneous c/w probably adenomyosis and right ovary with 1cm heterogeneous cyst c/w possible endometrioma    Overall MDM: moderate   Diagnosis moderate, Data moderate, Risk low

## 2023-10-26 ENCOUNTER — OFFICE VISIT (OUTPATIENT)
Dept: UROLOGY | Facility: CLINIC | Age: 38
End: 2023-10-26
Payer: COMMERCIAL

## 2023-10-26 VITALS
WEIGHT: 138.8 LBS | HEIGHT: 63 IN | DIASTOLIC BLOOD PRESSURE: 72 MMHG | OXYGEN SATURATION: 98 % | HEART RATE: 79 BPM | SYSTOLIC BLOOD PRESSURE: 112 MMHG | BODY MASS INDEX: 24.59 KG/M2

## 2023-10-26 DIAGNOSIS — R93.89 ABNORMAL FINDINGS ON DIAGNOSTIC IMAGING OF BODY STRUCTURES: Primary | ICD-10-CM

## 2023-10-26 PROCEDURE — 99204 OFFICE O/P NEW MOD 45 MIN: CPT | Performed by: UROLOGY
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
## 2023-11-03 ENCOUNTER — HOSPITAL ENCOUNTER (OUTPATIENT)
Dept: RADIOLOGY | Facility: HOSPITAL | Age: 38
Discharge: HOME | End: 2023-11-03
Attending: STUDENT IN AN ORGANIZED HEALTH CARE EDUCATION/TRAINING PROGRAM
Payer: COMMERCIAL

## 2023-11-03 DIAGNOSIS — N80.9 ENDOMETRIOSIS: ICD-10-CM

## 2023-11-03 DIAGNOSIS — R10.2 PELVIC PAIN: ICD-10-CM

## 2023-11-03 PROCEDURE — 76830 TRANSVAGINAL US NON-OB: CPT

## 2023-11-03 PROCEDURE — 76856 US EXAM PELVIC COMPLETE: CPT

## 2023-11-08 ENCOUNTER — TELEPHONE (OUTPATIENT)
Dept: GASTROENTEROLOGY | Facility: MEDICAL CENTER | Age: 38
End: 2023-11-08

## 2023-11-08 ENCOUNTER — CONSULT (OUTPATIENT)
Dept: GASTROENTEROLOGY | Facility: MEDICAL CENTER | Age: 38
End: 2023-11-08
Payer: COMMERCIAL

## 2023-11-08 VITALS
BODY MASS INDEX: 24.83 KG/M2 | WEIGHT: 138.4 LBS | TEMPERATURE: 96.3 F | HEART RATE: 68 BPM | DIASTOLIC BLOOD PRESSURE: 64 MMHG | SYSTOLIC BLOOD PRESSURE: 98 MMHG

## 2023-11-08 DIAGNOSIS — Z80.0 FAMILY HISTORY OF GI TRACT CANCER: ICD-10-CM

## 2023-11-08 DIAGNOSIS — R10.9 RIGHT SIDED ABDOMINAL PAIN: ICD-10-CM

## 2023-11-08 DIAGNOSIS — R14.0 BLOATING: Primary | ICD-10-CM

## 2023-11-08 DIAGNOSIS — K92.1 BLOOD IN STOOL: ICD-10-CM

## 2023-11-08 PROCEDURE — 99204 OFFICE O/P NEW MOD 45 MIN: CPT | Performed by: INTERNAL MEDICINE

## 2023-11-08 RX ORDER — BISACODYL 5 MG/1
10 TABLET, DELAYED RELEASE ORAL ONCE
Qty: 2 TABLET | Refills: 0 | Status: SHIPPED | OUTPATIENT
Start: 2023-11-08 | End: 2023-11-08
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
## 2023-11-15 ENCOUNTER — OFFICE VISIT (OUTPATIENT)
Dept: OBGYN CLINIC | Facility: CLINIC | Age: 38
End: 2023-11-15
Payer: COMMERCIAL

## 2023-11-15 VITALS — BODY MASS INDEX: 25.26 KG/M2 | SYSTOLIC BLOOD PRESSURE: 96 MMHG | DIASTOLIC BLOOD PRESSURE: 56 MMHG | WEIGHT: 140.8 LBS

## 2023-11-15 DIAGNOSIS — N80.9 ENDOMETRIOSIS: Primary | ICD-10-CM

## 2023-11-15 PROCEDURE — 99213 OFFICE O/P EST LOW 20 MIN: CPT | Performed by: STUDENT IN AN ORGANIZED HEALTH CARE EDUCATION/TRAINING PROGRAM

## 2023-11-15 NOTE — PROGRESS NOTES
Assessment/Plan:       Problem List Items Addressed This Visit          Other    Endometriosis - Primary     - ultrasound reviewed  - reviewed potential interventions, if she would like to proceed with surgery. Reviewed potential for TLH, debulking if necessary. She would be willing to remove one ovary, but would not be interested in BSO as she does not desire surgical menopause, even with hormonal replacement    RTO s/p GI eval and tx    Subjective:      Patient ID: Chela Mueller is a 43 y.o. female. HPI  She presents today for follow up after ultrasound. She also saw GI and is scheduled for a colonoscopy in December. The following portions of the patient's history were reviewed and updated as appropriate: allergies, current medications, past family history, past medical history, past social history, past surgical history, and problem list.    Review of Systems  see prior visit    Objective:  BP 96/56 (BP Location: Left arm, Patient Position: Sitting, Cuff Size: Standard)   Wt 63.9 kg (140 lb 12.8 oz)   LMP 10/21/2023 (Exact Date)   BMI 25.26 kg/m²      Physical Exam  Vitals reviewed. Constitutional:       Appearance: She is well-developed. HENT:      Head: Normocephalic. Pulmonary:      Effort: Pulmonary effort is normal.   Musculoskeletal:         General: Normal range of motion. Cervical back: Normal range of motion. Neurological:      Mental Status: She is alert and oriented to person, place, and time.    Psychiatric:         Behavior: Behavior normal.

## 2023-12-13 ENCOUNTER — ANESTHESIA EVENT (OUTPATIENT)
Dept: ANESTHESIOLOGY | Facility: HOSPITAL | Age: 38
End: 2023-12-13

## 2023-12-13 ENCOUNTER — ANESTHESIA (OUTPATIENT)
Dept: ANESTHESIOLOGY | Facility: HOSPITAL | Age: 38
End: 2023-12-13

## 2023-12-18 RX ORDER — SODIUM CHLORIDE 9 MG/ML
125 INJECTION, SOLUTION INTRAVENOUS CONTINUOUS
Status: CANCELLED | OUTPATIENT
Start: 2023-12-18

## 2023-12-20 ENCOUNTER — ANESTHESIA EVENT (OUTPATIENT)
Dept: GASTROENTEROLOGY | Facility: MEDICAL CENTER | Age: 38
End: 2023-12-20

## 2023-12-20 ENCOUNTER — ANESTHESIA (OUTPATIENT)
Dept: GASTROENTEROLOGY | Facility: MEDICAL CENTER | Age: 38
End: 2023-12-20

## 2023-12-20 ENCOUNTER — HOSPITAL ENCOUNTER (OUTPATIENT)
Dept: GASTROENTEROLOGY | Facility: MEDICAL CENTER | Age: 38
Setting detail: OUTPATIENT SURGERY
Discharge: HOME | End: 2023-12-20
Attending: INTERNAL MEDICINE
Payer: COMMERCIAL

## 2023-12-20 VITALS
TEMPERATURE: 97.2 F | RESPIRATION RATE: 1 BRPM | DIASTOLIC BLOOD PRESSURE: 55 MMHG | HEART RATE: 89 BPM | OXYGEN SATURATION: 99 % | HEIGHT: 62 IN | SYSTOLIC BLOOD PRESSURE: 106 MMHG | BODY MASS INDEX: 25.75 KG/M2

## 2023-12-20 DIAGNOSIS — K92.1 BLOOD IN STOOL: ICD-10-CM

## 2023-12-20 LAB
EXT PREGNANCY TEST URINE: NEGATIVE
EXT. CONTROL: NORMAL

## 2023-12-20 PROCEDURE — 88305 TISSUE EXAM BY PATHOLOGIST: CPT | Performed by: PATHOLOGY

## 2023-12-20 PROCEDURE — 45378 DIAGNOSTIC COLONOSCOPY: CPT | Performed by: INTERNAL MEDICINE

## 2023-12-20 PROCEDURE — 81025 URINE PREGNANCY TEST: CPT | Performed by: ANESTHESIOLOGY

## 2023-12-20 PROCEDURE — 43239 EGD BIOPSY SINGLE/MULTIPLE: CPT | Performed by: INTERNAL MEDICINE

## 2023-12-20 RX ORDER — SODIUM CHLORIDE 9 MG/ML
125 INJECTION, SOLUTION INTRAVENOUS CONTINUOUS
Status: DISCONTINUED | OUTPATIENT
Start: 2023-12-20 | End: 2023-12-24 | Stop reason: HOSPADM

## 2023-12-20 RX ORDER — LIDOCAINE HYDROCHLORIDE 20 MG/ML
INJECTION, SOLUTION EPIDURAL; INFILTRATION; INTRACAUDAL; PERINEURAL AS NEEDED
Status: DISCONTINUED | OUTPATIENT
Start: 2023-12-20 | End: 2023-12-20

## 2023-12-20 RX ORDER — PROPOFOL 10 MG/ML
INJECTION, EMULSION INTRAVENOUS AS NEEDED
Status: DISCONTINUED | OUTPATIENT
Start: 2023-12-20 | End: 2023-12-20

## 2023-12-20 RX ADMIN — PROPOFOL 30 MG: 10 INJECTION, EMULSION INTRAVENOUS at 11:24

## 2023-12-20 RX ADMIN — Medication 40 MG: at 11:27

## 2023-12-20 RX ADMIN — PROPOFOL 30 MG: 10 INJECTION, EMULSION INTRAVENOUS at 11:28

## 2023-12-20 RX ADMIN — PROPOFOL 40 MG: 10 INJECTION, EMULSION INTRAVENOUS at 11:40

## 2023-12-20 RX ADMIN — LIDOCAINE HYDROCHLORIDE 100 MG: 20 INJECTION, SOLUTION EPIDURAL; INFILTRATION; INTRACAUDAL at 11:12

## 2023-12-20 RX ADMIN — PROPOFOL 30 MG: 10 INJECTION, EMULSION INTRAVENOUS at 11:32

## 2023-12-20 RX ADMIN — PROPOFOL 30 MG: 10 INJECTION, EMULSION INTRAVENOUS at 11:37

## 2023-12-20 RX ADMIN — PROPOFOL 30 MG: 10 INJECTION, EMULSION INTRAVENOUS at 11:34

## 2023-12-20 RX ADMIN — PROPOFOL 30 MG: 10 INJECTION, EMULSION INTRAVENOUS at 11:30

## 2023-12-20 RX ADMIN — PROPOFOL 50 MG: 10 INJECTION, EMULSION INTRAVENOUS at 11:17

## 2023-12-20 RX ADMIN — PROPOFOL 180 MG: 10 INJECTION, EMULSION INTRAVENOUS at 11:12

## 2023-12-20 RX ADMIN — PROPOFOL 50 MG: 10 INJECTION, EMULSION INTRAVENOUS at 11:20

## 2023-12-20 RX ADMIN — PROPOFOL 50 MG: 10 INJECTION, EMULSION INTRAVENOUS at 11:14

## 2023-12-20 RX ADMIN — PROPOFOL 40 MG: 10 INJECTION, EMULSION INTRAVENOUS at 11:15

## 2023-12-20 RX ADMIN — PROPOFOL 30 MG: 10 INJECTION, EMULSION INTRAVENOUS at 11:38

## 2023-12-20 RX ADMIN — PROPOFOL 30 MG: 10 INJECTION, EMULSION INTRAVENOUS at 11:26

## 2023-12-20 RX ADMIN — SODIUM CHLORIDE 125 ML/HR: 0.9 INJECTION, SOLUTION INTRAVENOUS at 10:32

## 2023-12-20 RX ADMIN — PROPOFOL 30 MG: 10 INJECTION, EMULSION INTRAVENOUS at 11:22

## 2023-12-20 RX ADMIN — PROPOFOL 30 MG: 10 INJECTION, EMULSION INTRAVENOUS at 11:43

## 2023-12-20 NOTE — ANESTHESIA PREPROCEDURE EVALUATION
Procedure:  COLONOSCOPY  EGD    Relevant Problems   CARDIO   (+) Mixed hyperlipidemia                          Physical Exam    Airway    Mallampati score: III  TM Distance: >3 FB  Neck ROM: full     Dental       Cardiovascular  Rhythm: regular    Pulmonary  Comment: Bilateral chest rise, not using accessory muscles for breathing     Other Findings  post-pubertal.      Anesthesia Plan  ASA Score- 2     Anesthesia Type- IV sedation with anesthesia with ASA Monitors.         Additional Monitors:     Airway Plan:            Plan Factors-Exercise tolerance (METS): >4 METS.    Chart reviewed.   Existing labs reviewed.     Patient is not a current smoker.              Induction- intravenous.    Postoperative Plan-     Informed Consent- Anesthetic plan and risks discussed with patient.
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
## 2023-12-22 PROCEDURE — 88305 TISSUE EXAM BY PATHOLOGIST: CPT | Performed by: PATHOLOGY

## 2023-12-28 ENCOUNTER — TELEPHONE (OUTPATIENT)
Age: 38
End: 2023-12-28

## 2024-02-05 ENCOUNTER — ANNUAL EXAM (OUTPATIENT)
Dept: OBGYN CLINIC | Facility: CLINIC | Age: 39
End: 2024-02-05
Payer: COMMERCIAL

## 2024-02-05 VITALS
WEIGHT: 140 LBS | BODY MASS INDEX: 25.76 KG/M2 | HEIGHT: 62 IN | DIASTOLIC BLOOD PRESSURE: 56 MMHG | SYSTOLIC BLOOD PRESSURE: 94 MMHG

## 2024-02-05 DIAGNOSIS — Z12.31 ENCOUNTER FOR SCREENING MAMMOGRAM FOR MALIGNANT NEOPLASM OF BREAST: Primary | ICD-10-CM

## 2024-02-05 PROCEDURE — 99395 PREV VISIT EST AGE 18-39: CPT | Performed by: STUDENT IN AN ORGANIZED HEALTH CARE EDUCATION/TRAINING PROGRAM
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
## 2024-04-25 ENCOUNTER — TELEPHONE (OUTPATIENT)
Age: 39
End: 2024-04-25

## 2024-04-26 DIAGNOSIS — G89.29 OTHER CHRONIC PAIN: Primary | ICD-10-CM

## 2024-07-19 ENCOUNTER — APPOINTMENT (OUTPATIENT)
Dept: LAB | Facility: CLINIC | Age: 39
End: 2024-07-19
Payer: COMMERCIAL

## 2024-07-19 DIAGNOSIS — R93.89 ABNORMAL FINDINGS ON DIAGNOSTIC IMAGING OF BODY STRUCTURES: ICD-10-CM

## 2024-07-19 DIAGNOSIS — E61.1 IRON DEFICIENCY: ICD-10-CM

## 2024-07-19 LAB
CREAT SERPL-MCNC: 0.84 MG/DL (ref 0.6–1.3)
ERYTHROCYTE [DISTWIDTH] IN BLOOD BY AUTOMATED COUNT: 12.4 % (ref 11.6–15.1)
FERRITIN SERPL-MCNC: 39 NG/ML (ref 11–307)
GFR SERPL CREATININE-BSD FRML MDRD: 87 ML/MIN/1.73SQ M
HCT VFR BLD AUTO: 40 % (ref 34.8–46.1)
HGB BLD-MCNC: 13.2 G/DL (ref 11.5–15.4)
IRON SATN MFR SERPL: 30 % (ref 15–50)
IRON SERPL-MCNC: 110 UG/DL (ref 50–212)
MCH RBC QN AUTO: 29.3 PG (ref 26.8–34.3)
MCHC RBC AUTO-ENTMCNC: 33 G/DL (ref 31.4–37.4)
MCV RBC AUTO: 89 FL (ref 82–98)
PLATELET # BLD AUTO: 183 THOUSANDS/UL (ref 149–390)
PMV BLD AUTO: 12.3 FL (ref 8.9–12.7)
RBC # BLD AUTO: 4.51 MILLION/UL (ref 3.81–5.12)
TIBC SERPL-MCNC: 363 UG/DL (ref 250–450)
UIBC SERPL-MCNC: 253 UG/DL (ref 155–355)
VIT B12 SERPL-MCNC: 873 PG/ML (ref 180–914)
WBC # BLD AUTO: 7.86 THOUSAND/UL (ref 4.31–10.16)

## 2024-07-19 PROCEDURE — 83540 ASSAY OF IRON: CPT

## 2024-07-19 PROCEDURE — 82565 ASSAY OF CREATININE: CPT

## 2024-07-19 PROCEDURE — 85027 COMPLETE CBC AUTOMATED: CPT

## 2024-07-19 PROCEDURE — 36415 COLL VENOUS BLD VENIPUNCTURE: CPT

## 2024-07-19 PROCEDURE — 83550 IRON BINDING TEST: CPT

## 2024-07-19 PROCEDURE — 82728 ASSAY OF FERRITIN: CPT

## 2024-07-22 ENCOUNTER — TELEPHONE (OUTPATIENT)
Dept: FAMILY MEDICINE CLINIC | Facility: CLINIC | Age: 39
End: 2024-07-22

## 2024-07-22 NOTE — TELEPHONE ENCOUNTER
----- Message from Maria Guadalupe Baltazar MD sent at 7/19/2024  7:52 PM EDT -----  Improve in B12 level

## 2024-08-05 ENCOUNTER — APPOINTMENT (OUTPATIENT)
Dept: LAB | Facility: CLINIC | Age: 39
End: 2024-08-05
Payer: COMMERCIAL

## 2024-08-05 ENCOUNTER — TRANSCRIBE ORDERS (OUTPATIENT)
Dept: LAB | Facility: CLINIC | Age: 39
End: 2024-08-05

## 2024-08-05 DIAGNOSIS — E28.2 PCOS (POLYCYSTIC OVARIAN SYNDROME): ICD-10-CM

## 2024-08-05 DIAGNOSIS — E34.9 HORMONE DEFICIENCY: ICD-10-CM

## 2024-08-05 DIAGNOSIS — E28.2 PCOS (POLYCYSTIC OVARIAN SYNDROME): Primary | ICD-10-CM

## 2024-08-05 LAB — INSULIN SERPL-ACNC: 3.52 UIU/ML (ref 1.9–23)

## 2024-08-05 PROCEDURE — 36415 COLL VENOUS BLD VENIPUNCTURE: CPT

## 2024-08-05 PROCEDURE — 84140 ASSAY OF PREGNENOLONE: CPT

## 2024-08-05 PROCEDURE — 83525 ASSAY OF INSULIN: CPT

## 2024-08-12 LAB — PREG SERPL-MCNC: 18 NG/DL

## 2024-08-15 ENCOUNTER — TELEPHONE (OUTPATIENT)
Age: 39
End: 2024-08-15

## 2024-08-27 ENCOUNTER — TELEPHONE (OUTPATIENT)
Dept: FAMILY MEDICINE CLINIC | Facility: CLINIC | Age: 39
End: 2024-08-27

## 2024-08-30 NOTE — TELEPHONE ENCOUNTER
08/30/24 3:48 PM        The office's request has been received, reviewed, and the patient chart updated. The PCP has successfully been removed with a patient attribution note. This message will now be completed.        Thank you  Bal Stoll